# Patient Record
Sex: FEMALE | Race: WHITE | NOT HISPANIC OR LATINO | URBAN - METROPOLITAN AREA
[De-identification: names, ages, dates, MRNs, and addresses within clinical notes are randomized per-mention and may not be internally consistent; named-entity substitution may affect disease eponyms.]

---

## 2017-06-11 ENCOUNTER — EMERGENCY (EMERGENCY)
Facility: HOSPITAL | Age: 22
LOS: 1 days | Discharge: HOME | End: 2017-06-11
Attending: EMERGENCY MEDICINE | Admitting: EMERGENCY MEDICINE
Payer: COMMERCIAL

## 2017-06-11 VITALS
TEMPERATURE: 98 F | HEART RATE: 74 BPM | SYSTOLIC BLOOD PRESSURE: 101 MMHG | RESPIRATION RATE: 16 BRPM | DIASTOLIC BLOOD PRESSURE: 70 MMHG | OXYGEN SATURATION: 99 %

## 2017-06-11 VITALS
DIASTOLIC BLOOD PRESSURE: 68 MMHG | TEMPERATURE: 98 F | OXYGEN SATURATION: 99 % | SYSTOLIC BLOOD PRESSURE: 102 MMHG | RESPIRATION RATE: 16 BRPM | HEART RATE: 80 BPM

## 2017-06-11 DIAGNOSIS — Z90.49 ACQUIRED ABSENCE OF OTHER SPECIFIED PARTS OF DIGESTIVE TRACT: Chronic | ICD-10-CM

## 2017-06-11 DIAGNOSIS — Z33.1 PREGNANT STATE, INCIDENTAL: ICD-10-CM

## 2017-06-11 LAB
APPEARANCE UR: CLEAR — SIGNIFICANT CHANGE UP
BILIRUB UR-MCNC: NEGATIVE — SIGNIFICANT CHANGE UP
COLOR SPEC: SIGNIFICANT CHANGE UP
DIFF PNL FLD: NEGATIVE — SIGNIFICANT CHANGE UP
GLUCOSE UR QL: NEGATIVE — SIGNIFICANT CHANGE UP
HCG SERPL-ACNC: 451.6 MIU/ML — SIGNIFICANT CHANGE UP
KETONES UR-MCNC: NEGATIVE — SIGNIFICANT CHANGE UP
LEUKOCYTE ESTERASE UR-ACNC: NEGATIVE — SIGNIFICANT CHANGE UP
NITRITE UR-MCNC: NEGATIVE — SIGNIFICANT CHANGE UP
PH UR: 7 — SIGNIFICANT CHANGE UP (ref 5–8)
PROT UR-MCNC: SIGNIFICANT CHANGE UP
SP GR SPEC: 1.01 — SIGNIFICANT CHANGE UP (ref 1.01–1.02)
UROBILINOGEN FLD QL: NEGATIVE — SIGNIFICANT CHANGE UP

## 2017-06-11 PROCEDURE — 84702 CHORIONIC GONADOTROPIN TEST: CPT

## 2017-06-11 PROCEDURE — 76830 TRANSVAGINAL US NON-OB: CPT | Mod: 26

## 2017-06-11 PROCEDURE — 81003 URINALYSIS AUTO W/O SCOPE: CPT

## 2017-06-11 PROCEDURE — 93975 VASCULAR STUDY: CPT

## 2017-06-11 PROCEDURE — 76830 TRANSVAGINAL US NON-OB: CPT

## 2017-06-11 PROCEDURE — 99285 EMERGENCY DEPT VISIT HI MDM: CPT

## 2017-06-11 PROCEDURE — 93975 VASCULAR STUDY: CPT | Mod: 26

## 2017-06-11 PROCEDURE — 87086 URINE CULTURE/COLONY COUNT: CPT

## 2017-06-11 PROCEDURE — 99284 EMERGENCY DEPT VISIT MOD MDM: CPT | Mod: 25

## 2017-06-11 RX ORDER — SODIUM CHLORIDE 9 MG/ML
1000 INJECTION INTRAMUSCULAR; INTRAVENOUS; SUBCUTANEOUS ONCE
Qty: 0 | Refills: 0 | Status: COMPLETED | OUTPATIENT
Start: 2017-06-11 | End: 2017-06-11

## 2017-06-11 RX ADMIN — SODIUM CHLORIDE 1000 MILLILITER(S): 9 INJECTION INTRAMUSCULAR; INTRAVENOUS; SUBCUTANEOUS at 18:04

## 2017-06-11 NOTE — ED PROVIDER NOTE - MEDICAL DECISION MAKING DETAILS
21 year old woman presents with first pregnancy presents 4 weeks pregnant with concerns surrounding her pregnancy cramps.   - HCG   - TVUS   - reassurance

## 2017-06-11 NOTE — CONSULT NOTE ADULT - ASSESSMENT
21y  LMP 2017 w/ pregnancy of unknown location with intermittent lower abdominal pain, none currently, most likely early pregnancy, r/o ectopic pregnancy

## 2017-06-11 NOTE — CONSULT NOTE ADULT - SUBJECTIVE AND OBJECTIVE BOX
21y  LMP 2017 presents with positive home pregnancy test, confirmed at [facility] today. Pt reports intermittent lower abdominal cramping lasting "1 second", overnight, sent by [facility] physician for r/o ectopic pregnancy. Pt denies lower abdominal pain currently. Denies vaginal bleeding. Denies CP, SOB, fevers, chills, dysuria, change in GI sx      OB/GYN HISTORY:   G1: current       Last Menstrual Period 2017    Name of GYN Physician: Dr. Vilma Taylor  Date of Last Pap: 2016  History of Abnormal Pap:2016, w/u wnl     PAST MEDICAL & SURGICAL HISTORY:  No pertinent past medical history  History of appendectomy (at 8 yrs old)      REVIEW OF SYSTEMS    General: denies fevers, chills, tiredness    Skin/Breast: denies breast pain  	  Respiratory and Thorax: denies shortness of breath, denies cough  	  Cardiovascular: denies chest pain	 and denies palpitations    Gastrointestinal: denies abdominal pain, nausea/ vomiting	    Genitourinary: denies dysuria, increased urinary frequency, urgency	    Constitutional, Cardiovascular, Respiratory, Gastrointestinal, Genitourinary, Musculoskeletal and Integumentary review of systems are normal except as noted. 	    Meds: none    Allergies:No Known Allergies, Intolerances        SOCIAL HISTORY: denies x3    FAMILY HISTORY: non contributory      Vital Signs Last 24 Hrs  T(C): 36.9, Max: 36.9 ( @ 16:43)  T(F): 98.5, Max: 98.5 ( @ 16:43)  HR: 74 (74 - 74)  BP: 101/70 (101/70 - 101/70)  BP(mean): --  RR: 16 (16 - 16)  SpO2: 99% (99% - 99%)    PHYSICAL EXAM:      Gen: NAD, alert and oriented x 3    Cardiovascular: regular rate and rhythm, S1 and S2 present     Respiratory: CTAB    Abd: soft, non tender, non-distended, + bowel sounds.     Pelvic:  closed/ long, no CMT, Uterus: normal size, non tender    Adnexa: non tender, no palpable masses    Extremities: NTBL    Skin: warm and well perfused      LABS:    bHC.6  Urinalysis Basic - ( 2017 19:22 )    Color: PL Yellow / Appearance: Clear / S.013 / pH: x  Gluc: x / Ketone: Negative  / Bili: Negative / Urobili: Negative   Blood: x / Protein: Trace / Nitrite: Negative   Leuk Esterase: Negative / RBC: x / WBC x   Sq Epi: x / Non Sq Epi: x / Bacteria: x    TVUS (): uterus 6.3 x 3.3 x 4.1 cm. Endometrium: Measures 1.0 cm. Small amount of fluid is noted within the   endometrial canal. R ov: 2.5 x 1.7 x 2.2 cm. L ov: 4.8 x 4.3 x 4.7 cm. There is a 3.6 x 3.4 x 3.6 cm   simple cyst. There is a 1.9 x 1.7 x 1.2 cm corpus luteal cyst. Normal flow in both ovaries. Mild free fluid in the pelvis.

## 2017-06-11 NOTE — CONSULT NOTE ADULT - PROBLEM SELECTOR RECOMMENDATION 9
-rpt bHCG in 48hr at outside lab  -will call pt with results  -cont to f/u with OB Dr. Vilma Sagastume, PGY-1  D/w Dr. Shoemaker  -ectopic precautions given

## 2017-06-11 NOTE — ED PROVIDER NOTE - ATTENDING CONTRIBUTION TO CARE
I have seen and evaluated this patient with the resident.   I agree with the findings  unless other wise stated.  After my face to face bedside evaluation, I am not[age] year old female with intermittent abdominal pain x 1week found out she is pregnant today. Not having any pain here. no vaginal bleeding. PE: att exam: patient awake alert NAD . LUNGS CTAB no wheeze no crackle. CARD RRR no m/r/g.  Abdomen soft NT ND no rebound no guarding no CVA tenderness. EXT WWP no edema no calf tenderness CV 2+DP/PT bilaterally. neuro A&Ox3 gait normal.  skin warm and dry no rash

## 2017-06-11 NOTE — ED PROVIDER NOTE - OBJECTIVE STATEMENT
21 year old woman with no significant past medical history presents with concerns that she just found out that she is pregnant. This would be her first pregnancy and never has she ever experienced pelvic cramping before and is alarmed by the symptoms. LMP waa 5/5/ 2017 and today she was at urgent care where a pregnancy test resulted posityive. HCG is pending but given she was inpatient came to [facility] ER for repeat testing.   Of note the patient denies hematochezia, melena, brbpr, hematemesis, abdominal pain, nausea, vomiting, diarrhea or constipation. 21 year old female , with no significant past medical history presents with concerns that she just found out that she is pregnant. This would be her first pregnancy and never has she ever experienced pelvic cramping before and is alarmed by the symptoms. LMP waa 2017 and today she was at urgent care where a pregnancy test resulted positive. Patient denies pain at this time. No vaginal bleeding.   Of note the patient denies hematochezia, melena, brbpr, hematemesis, abdominal pain, nausea, vomiting, diarrhea or constipation.

## 2017-06-11 NOTE — ED ADULT NURSE NOTE - OBJECTIVE STATEMENT
21 year old female with co lower abd pain x 4-5 days. was seen at urgent care today where urine test showed she was pregnant.  LMP may 5th. first pregnancy no fever no chills denies CP SOB abd soft non tender  (+) pain in lower abd described as cramping intermittent no dysuria/hematuria

## 2017-06-12 LAB
CULTURE RESULTS: SIGNIFICANT CHANGE UP
SPECIMEN SOURCE: SIGNIFICANT CHANGE UP

## 2017-06-13 NOTE — ED POST DISCHARGE NOTE - DETAILS
Left VM for patient. Will recommend f/u with PCP for repeat UA/UCx. - Marietta Berry PA-C SOTERO Martinez PA Miri Dooley PA-C

## 2017-06-13 NOTE — ED POST DISCHARGE NOTE - OTHER COMMUNICATION
Spoke with patient and her .  Made aware of contaminated UCx and need to repeat UCx for continued symptoms.  Patient's  reports that they were instructed to have repeat beta HCG, but they never followed up.  Patient with continued abdominal cramping.  Patient instruced to return to the ER as soon as possible for further evaluation.  -Mert Patino PA-C

## 2017-06-13 NOTE — ED POST DISCHARGE NOTE - RESULT SUMMARY
UCx contaminated. Left VM for patient. Will recommend f/u with PCP for repeat UA/UCx. - Marietta Berry PA-C UCx contaminated.

## 2017-06-15 DIAGNOSIS — O26.899 OTHER SPECIFIED PREGNANCY RELATED CONDITIONS, UNSPECIFIED TRIMESTER: ICD-10-CM

## 2017-06-15 DIAGNOSIS — R10.2 PELVIC AND PERINEAL PAIN: ICD-10-CM

## 2017-06-18 ENCOUNTER — EMERGENCY (EMERGENCY)
Facility: HOSPITAL | Age: 22
LOS: 1 days | Discharge: HOME | End: 2017-06-18
Attending: EMERGENCY MEDICINE | Admitting: EMERGENCY MEDICINE
Payer: COMMERCIAL

## 2017-06-18 VITALS
RESPIRATION RATE: 16 BRPM | TEMPERATURE: 98 F | OXYGEN SATURATION: 99 % | DIASTOLIC BLOOD PRESSURE: 69 MMHG | SYSTOLIC BLOOD PRESSURE: 106 MMHG | HEART RATE: 86 BPM

## 2017-06-18 DIAGNOSIS — Z90.49 ACQUIRED ABSENCE OF OTHER SPECIFIED PARTS OF DIGESTIVE TRACT: Chronic | ICD-10-CM

## 2017-06-18 LAB — HCG SERPL-ACNC: 5416 MIU/ML — SIGNIFICANT CHANGE UP

## 2017-06-18 PROCEDURE — 84702 CHORIONIC GONADOTROPIN TEST: CPT

## 2017-06-18 PROCEDURE — 99283 EMERGENCY DEPT VISIT LOW MDM: CPT

## 2017-06-18 PROCEDURE — 99284 EMERGENCY DEPT VISIT MOD MDM: CPT

## 2017-06-18 NOTE — ED PROVIDER NOTE - CHPI ED SYMPTOMS POS
**ATTENDING ADDENDUM (Dr. Andrew Laws): menstrual-like cramping in context of newly identified pregnancy without established intrauterine pregnancy on US

## 2017-06-18 NOTE — ED PROVIDER NOTE - MEDICAL DECISION MAKING DETAILS
21F here for repeat beta-HCG. No pain or vaginal discharge. . Will repeat blood test. Panchito Jean-Baptiste, Resident. 21F here for repeat beta-HCG. No pain or vaginal discharge. . Will repeat blood test. Panchito Jean-Baptiste, Resident.  **ATTENDING MEDICAL DECISION MAKING/SYNTHESIS (Dr. Andrew Laws): I have reviewed the Chief Complaint, the HPI, the ROS, and have directly performed and confirmed the findings on the Physical Examination. I have reviewed the medical decision making with all providers, as applicable. The PROBLEM REPRESENTATION at this time is: 21-year-old woman with newly diagnosed pregnancy within the past week, now presenting to the ED for serial beta-HCG; NO acute complains associated with this newly-diagnosed pregnancy except for menstrual-like cramps last week. The MOST LIKELY DIAGNOSIS, and the LIST OF DIFFERENTIAL DIAGNOSES, includes (but is not limited to) the following: early intrauterine pregnancy (likely), ectopic pregnancy (diagnosis of exclusion), threatened  (possible), other OB-GYN emergency (unlikely), other medical or surgical emergency in context of pregnancy e.g. acute appendicitis, acute biliary disease (e.g. cholelithiasis, cholecystitis, or cholangitis), cyst, colitis, urinary tract infection, pyelonephritis or equivalent (unlikely given presentation and clinical findings). The likelihood of each of these diagnoses has been appropriately considered in the context of this patient's presentation and my evaluation. PLAN: as described in EMR, including diagnostics, therapeutics and consultation as clinically warranted. I will continue to reevaluate the patient, including the results of all testing, and monitor response to therapy throughout the patient's course in the ED. 21F here for repeat beta-HCG. No pain or vaginal discharge. . Will repeat blood test. Panchito Jean-Baptiste, Resident.  **ATTENDING MEDICAL DECISION MAKING/SYNTHESIS (Dr. Andrew Laws): I have reviewed the Chief Complaint, the HPI, the ROS, and have directly performed and confirmed the findings on the Physical Examination. I have reviewed the medical decision making with all providers, as applicable. The PROBLEM REPRESENTATION at this time is: 21-year-old woman with newly diagnosed pregnancy within the past week, now presenting to the ED for serial beta-HCG; NO acute complains associated with this newly-diagnosed pregnancy except for menstrual-like cramps last week (NO seizure currently, feels well). The MOST LIKELY DIAGNOSIS, and the LIST OF DIFFERENTIAL DIAGNOSES, includes (but is not limited to) the following: early intrauterine pregnancy (likely), ectopic pregnancy (diagnosis of exclusion), threatened  (possible), other OB-GYN emergency (unlikely), other medical or surgical emergency in context of pregnancy e.g. acute appendicitis, acute biliary disease (e.g. cholelithiasis, cholecystitis, or cholangitis), cyst, colitis, urinary tract infection, pyelonephritis or equivalent (unlikely given presentation and clinical findings). The likelihood of each of these diagnoses has been appropriately considered in the context of this patient's presentation and my evaluation. PLAN: as described in EMR, including diagnostics, therapeutics and consultation as clinically warranted. I will continue to reevaluate the patient, including the results of all testing, and monitor response to therapy throughout the patient's course in the ED.

## 2017-06-18 NOTE — ED PROVIDER NOTE - CARE PLAN
Goal:	ATTENDING ADDENDUM (Dr. Andrew Laws): Goals of care include resolution of emergent/urgent symptoms and concerns, and restoration to baseline level of homeostasis. Principal Discharge DX:	Less than 8 weeks gestation of pregnancy  Goal:	ATTENDING ADDENDUM (Dr. Andrew Laws): Goals of care include resolution of emergent/urgent symptoms and concerns, and restoration to baseline level of homeostasis. Principal Discharge DX:	Less than 8 weeks gestation of pregnancy  Goal:	ATTENDING ADDENDUM (Dr. Andrew Laws): Goals of care include resolution of emergent/urgent symptoms and concerns, and restoration to baseline level of homeostasis.  Instructions for follow-up, activity and diet:	ATTENDING ADDENDUM (Dr. Andrew Laws): (1) anticipatory guidance provided  (2) rest  (3) outpatient follow-up with your primary care physician/provider (4) return if symptoms worsen, persist, or do not resolve (5) medications, if indicated, as prescribed

## 2017-06-18 NOTE — ED PROVIDER NOTE - PLAN OF CARE
ATTENDING ADDENDUM (Dr. Andrew Laws): Goals of care include resolution of emergent/urgent symptoms and concerns, and restoration to baseline level of homeostasis. ATTENDING ADDENDUM (Dr. Andrew Laws): (1) anticipatory guidance provided  (2) rest  (3) outpatient follow-up with your primary care physician/provider (4) return if symptoms worsen, persist, or do not resolve (5) medications, if indicated, as prescribed

## 2017-06-18 NOTE — ED PROVIDER NOTE - PHYSICAL EXAMINATION
**ATTENDING ADDENDUM (Dr. Andrew Laws): I have reviewed and substantially contributed to the elements of the PE as documented above. I have directly performed an examination of this patient in conjunction with the other members (EM resident/PA/NP) of the patient care team.

## 2017-06-18 NOTE — ED PROVIDER NOTE - CHPI ED SYMPTOMS NEG
no vaginal discharge/no back pain/no chills/no nausea/no fever/no pain/no discharge/no dysuria/**ATTENDING ADDENDUM (Dr. Andrew Laws): NO pain or tenderness currently./no vomiting/no abdominal pain

## 2017-06-18 NOTE — ED ADULT NURSE NOTE - OBJECTIVE STATEMENT
Pt is here for repeat beta HCG.  pt had LMP may 5th 2017 . Zully vag bleed abdominal pain last weak  USG didn't show gestational sac. SO adviced for Repeat  beta & follow up care . Pt got evaluated  by ED MD Dr Laws bloods sent awaiting evaluation

## 2017-06-18 NOTE — ED PROVIDER NOTE - PROGRESS NOTE DETAILS
**ATTENDING ADDENDUM (Dr. Andrew Laws): patient serially evaluated throughout ED course. NO acute deterioration up to this time in the ED. Awaiting results of diagnostics (just sent at this time). Will continue to observe and monitor closely. Anticipatory guidance provided. **ATTENDING ADDENDUM (Dr. Andrew Laws): patient serially evaluated throughout ED course. NO acute deterioration up to this time in the ED. Of note, and in addition to the above, patient without acute complaints. Awaiting results of ED diagnostics at this time. Anticipatory guidance provided re: followup with primary care physician/provider (stated preference to followup with [facility]). Will provide details upon discharge if appropriate. NO clinical evidence of domestic violence or abuse at this time (normal-appearing interaction noted between patient and partner).

## 2017-06-18 NOTE — ED PROVIDER NOTE - ATTENDING CONTRIBUTION TO CARE
**ATTENDING ADDENDUM (Dr. Andrew Laws): I attest that I have directly examined this patient and reviewed and formulated the diagnostic and therapeutic management plan in collaboration with the EM resident. Please see MDM note and remainder of EMR for findings from CC, HPI, ROS, and PE. (Jerilyn)

## 2017-06-18 NOTE — ED PROVIDER NOTE - CONDUCTED A DETAILED DISCUSSION WITH PATIENT AND/OR GUARDIAN REGARDING, MDM
return to ED if symptoms worsen, persist or questions arise/**ATTENDING ADDENDUM (Dr. Andrew Laws): Anticipatory guidance provided./lab results/need for outpatient follow-up

## 2017-06-18 NOTE — ED ADULT NURSE NOTE - CAS EDN DISCHARGE ASSESSMENT
Denies any acute distress. DC in stable condition/Awake/Alert and oriented to person, place and time

## 2017-06-18 NOTE — ED PROVIDER NOTE - OBJECTIVE STATEMENT
21F no past medical history LMP 6/5/17 presents for repeat b-HCG. 1 wk ago patient had urine preg positive test, went to UC and told them she was having mentrual like cramps and rushed to ER for r/o ectopic. Too early to confirm IUP on US. Told to come back today for repeat beta. Denies fevers/chills, nausea/vomiting, headache, chest pain, shortness of breath, abdominal pain, bowel/bladder changes, vaginal bleeding/discharge. 21F no past medical history LMP 17 presents for repeat b-HCG. 1 wk ago patient had urine preg positive test, went to UC and told them she was having mentrual like cramps and rushed to ER for r/o ectopic. Too early to confirm IUP on US. Told to come back today for repeat beta. Denies fevers/chills, nausea/vomiting, headache, chest pain, shortness of breath, abdominal pain, bowel/bladder changes, vaginal bleeding/discharge.  **ATTENDING ADDENDUM (Dr. Andrew Laws): I attest that I have directly and personally interviewed and examined this patient and elicited a comparable history of present illness and review of systems as documented, along with my EM resident. I attest that I have made significant contributions to the documentation where necessary and as noted in the EMR. NOTE: this is the text from the ED PROVIDER NOTE by Hernesto from 2017: "21 year old female , with no significant past medical history presents with concerns that she just found out that she is pregnant. This would be her first pregnancy and never has she ever experienced pelvic cramping before and is alarmed by the symptoms. LMP waa 2017 and today she was at urgent care where a pregnancy test resulted positive. Patient denies pain at this time. No vaginal bleeding." During interval period, patient reports NO fevers, chills, syncope, near-syncope, chest pain, palpitations, shortness of breath, dyspnea on exertion, abdominal pain, frequency, urgency, hesitancy, dysuria, or flank/back pain. NO new medications. NO vaginal bleeding or discharge reported. Here for evaluation. VAS 0-1/10. 21F no past medical history LMP 17 presents for repeat b-HCG. 1 wk ago patient had urine preg positive test, went to UC and told them she was having mentrual like cramps and rushed to ER for r/o ectopic. Too early to confirm IUP on US. Told to come back today for repeat beta. Denies fevers/chills, nausea/vomiting, headache, chest pain, shortness of breath, abdominal pain, bowel/bladder changes, vaginal bleeding/discharge.  **ATTENDING ADDENDUM (Dr. Andrew Laws): I attest that I have directly and personally interviewed and examined this patient and elicited a comparable history of present illness and review of systems as documented, along with my EM resident. I attest that I have made significant contributions to the documentation where necessary and as noted in the EMR. NOTE: this is the text from the ED PROVIDER NOTE by Hernesto from 2017: "21 year old female , with no significant past medical history presents with concerns that she just found out that she is pregnant. This would be her first pregnancy and never has she ever experienced pelvic cramping before and is alarmed by the symptoms. LMP waa 2017 and today she was at urgent care where a pregnancy test resulted positive. Patient denies pain at this time. No vaginal bleeding." During interval period, patient reports NO fevers, chills, syncope, near-syncope, chest pain, palpitations, shortness of breath, dyspnea on exertion, abdominal pain, frequency, urgency, hesitancy, dysuria, or flank/back pain. NO new medications. NO vaginal bleeding or discharge reported. DENIES other pregnancies or medical problems. Has been adherent with prior recommendations to take prenatal vitamins as previously prescribed. Here for evaluation. VAS 0-1/10.

## 2017-06-21 NOTE — CHART NOTE - NSCHARTNOTEFT_GEN_A_CORE
Called patient to review results of rising bHCG and to arrange for follow-up clinic appointment and TVUS.  Patient did not answer phone.  Message left with clinic contact number.    JOSH Burks PGY2

## 2017-06-23 ENCOUNTER — APPOINTMENT (OUTPATIENT)
Dept: OBGYN | Facility: CLINIC | Age: 22
End: 2017-06-23

## 2017-06-23 VITALS
DIASTOLIC BLOOD PRESSURE: 69 MMHG | WEIGHT: 190.38 LBS | HEIGHT: 69 IN | BODY MASS INDEX: 28.2 KG/M2 | SYSTOLIC BLOOD PRESSURE: 106 MMHG

## 2017-06-23 DIAGNOSIS — Z01.419 ENCOUNTER FOR GYNECOLOGICAL EXAMINATION (GENERAL) (ROUTINE) W/OUT ABNORMAL FINDINGS: ICD-10-CM

## 2017-06-23 DIAGNOSIS — Z56.0 UNEMPLOYMENT, UNSPECIFIED: ICD-10-CM

## 2017-06-23 DIAGNOSIS — Z78.9 OTHER SPECIFIED HEALTH STATUS: ICD-10-CM

## 2017-06-23 PROBLEM — Z00.00 ENCOUNTER FOR PREVENTIVE HEALTH EXAMINATION: Status: ACTIVE | Noted: 2017-06-23

## 2017-06-23 RX ORDER — PRENATAL VIT NO.130/IRON/FOLIC 27MG-0.8MG
TABLET ORAL
Refills: 0 | Status: ACTIVE | COMMUNITY

## 2017-06-23 SDOH — ECONOMIC STABILITY - INCOME SECURITY: UNEMPLOYMENT, UNSPECIFIED: Z56.0

## 2017-06-25 LAB
C TRACH RRNA SPEC QL NAA+PROBE: NORMAL
N GONORRHOEA RRNA SPEC QL NAA+PROBE: NORMAL
SOURCE AMPLIFICATION: NORMAL

## 2017-07-03 LAB — CYTOLOGY CVX/VAG DOC THIN PREP: NORMAL

## 2017-07-06 ENCOUNTER — APPOINTMENT (OUTPATIENT)
Dept: OBGYN | Facility: CLINIC | Age: 22
End: 2017-07-06

## 2017-07-06 VITALS
SYSTOLIC BLOOD PRESSURE: 98 MMHG | BODY MASS INDEX: 28.58 KG/M2 | DIASTOLIC BLOOD PRESSURE: 60 MMHG | HEIGHT: 69 IN | WEIGHT: 193 LBS

## 2017-07-20 PROBLEM — N94.89 SUPPRESSION OF MENSES: Status: RESOLVED | Noted: 2017-06-23 | Resolved: 2017-07-20

## 2017-07-21 ENCOUNTER — APPOINTMENT (OUTPATIENT)
Dept: OBGYN | Facility: CLINIC | Age: 22
End: 2017-07-21

## 2017-07-21 VITALS
DIASTOLIC BLOOD PRESSURE: 60 MMHG | SYSTOLIC BLOOD PRESSURE: 100 MMHG | HEIGHT: 69 IN | WEIGHT: 194 LBS | BODY MASS INDEX: 28.73 KG/M2

## 2017-07-21 DIAGNOSIS — N94.89 OTHER SPECIFIED CONDITIONS ASSOCIATED WITH FEMALE GENITAL ORGANS AND MENSTRUAL CYCLE: ICD-10-CM

## 2017-07-27 ENCOUNTER — APPOINTMENT (OUTPATIENT)
Dept: ANTEPARTUM | Facility: CLINIC | Age: 22
End: 2017-07-27
Payer: COMMERCIAL

## 2017-07-27 ENCOUNTER — ASOB RESULT (OUTPATIENT)
Age: 22
End: 2017-07-27

## 2017-07-27 LAB
ABO + RH PNL BLD: NORMAL
ADJUSTED MITOGEN: 8.76 IU/ML
ADJUSTED TB AG: -0.94 IU/ML
B19V IGG SER QL IA: 4.5 INDEX
B19V IGG+IGM SER-IMP: NORMAL
B19V IGG+IGM SER-IMP: POSITIVE
B19V IGM FLD-ACNC: 0.2 INDEX
B19V IGM SER-ACNC: NEGATIVE
BASOPHILS # BLD AUTO: 0.01 K/UL
BASOPHILS NFR BLD AUTO: 0.1 %
BLD GP AB SCN SERPL QL: NORMAL
CMV IGG SERPL QL: 5.7 U/ML
CMV IGG SERPL-IMP: POSITIVE
CMV IGM SERPL QL: <8 AU/ML
CMV IGM SERPL QL: NEGATIVE
EOSINOPHIL # BLD AUTO: 0.06 K/UL
EOSINOPHIL NFR BLD AUTO: 0.5 %
HBV SURFACE AG SER QL: NONREACTIVE
HCT VFR BLD CALC: 34.5 %
HCV AB SER QL: NONREACTIVE
HCV S/CO RATIO: 0.09 S/CO
HGB A MFR BLD: 96.5 %
HGB A2 MFR BLD: 2.8 %
HGB BLD-MCNC: 11.3 G/DL
HGB F MFR BLD: 0.7 %
HGB FRACT BLD-IMP: NORMAL
HIV1+2 AB SPEC QL IA.RAPID: NONREACTIVE
IMM GRANULOCYTES NFR BLD AUTO: 0.2 %
LEAD BLD-MCNC: 2 UG/DL
LYMPHOCYTES # BLD AUTO: 3.63 K/UL
LYMPHOCYTES NFR BLD AUTO: 27.4 %
M TB IFN-G BLD-IMP: NEGATIVE
MAN DIFF?: NORMAL
MCHC RBC-ENTMCNC: 30 PG
MCHC RBC-ENTMCNC: 32.8 GM/DL
MCV RBC AUTO: 91.5 FL
MONOCYTES # BLD AUTO: 0.72 K/UL
MONOCYTES NFR BLD AUTO: 5.4 %
NEUTROPHILS # BLD AUTO: 8.8 K/UL
NEUTROPHILS NFR BLD AUTO: 66.4 %
PLATELET # BLD AUTO: 276 K/UL
QUANTIFERON GOLD NIL: 2.14 IU/ML
RBC # BLD: 3.77 M/UL
RBC # FLD: 12.6 %
RPR SER QL: NORMAL
RUBV IGG FLD-ACNC: 3.2 INDEX
RUBV IGG SER-IMP: POSITIVE
TSH SERPL-ACNC: 0.42 UIU/ML
VZV AB TITR SER: POSITIVE
VZV IGG SER IF-ACNC: 1197 INDEX
WBC # FLD AUTO: 13.25 K/UL

## 2017-07-27 PROCEDURE — 76801 OB US < 14 WKS SINGLE FETUS: CPT

## 2017-07-28 LAB — FMR1 GENE MUT ANL BLD/T: NORMAL

## 2017-08-03 ENCOUNTER — ASOB RESULT (OUTPATIENT)
Age: 22
End: 2017-08-03

## 2017-08-03 ENCOUNTER — APPOINTMENT (OUTPATIENT)
Dept: ANTEPARTUM | Facility: CLINIC | Age: 22
End: 2017-08-03
Payer: COMMERCIAL

## 2017-08-03 LAB
AR GENE MUT ANL BLD/T: NORMAL
CFTR MUT TESTED BLD/T: NORMAL

## 2017-08-03 PROCEDURE — 76813 OB US NUCHAL MEAS 1 GEST: CPT

## 2017-08-03 RX ORDER — ASCORBIC ACID, CHOLECALCIFEROL, .ALPHA.-TOCOPHEROL ACETATE, DL-, PYRIDOXINE, FOLIC ACID, CALCIUM, FERROUS FUMARATE, DOCONEXENT 28; 160; 27; 400; 30; 25; 1.25; 3 MG/1; MG/1; MG/1; [IU]/1; [IU]/1; MG/1; MG/1; MG/1
27-1.25-3 CAPSULE, GELATIN COATED ORAL
Qty: 90 | Refills: 3 | Status: ACTIVE | COMMUNITY
Start: 2017-07-06 | End: 1900-01-01

## 2017-08-15 PROBLEM — Z34.01 ENCOUNTER FOR PRENATAL CARE IN FIRST TRIMESTER OF FIRST PREGNANCY: Status: RESOLVED | Noted: 2017-07-20 | Resolved: 2017-08-15

## 2017-08-15 PROBLEM — O09.611 YOUNG PRIMIGRAVIDA IN FIRST TRIMESTER: Status: RESOLVED | Noted: 2017-07-20 | Resolved: 2017-08-15

## 2017-08-17 ENCOUNTER — APPOINTMENT (OUTPATIENT)
Dept: OBGYN | Facility: CLINIC | Age: 22
End: 2017-08-17
Payer: COMMERCIAL

## 2017-08-17 VITALS
BODY MASS INDEX: 28.58 KG/M2 | SYSTOLIC BLOOD PRESSURE: 100 MMHG | DIASTOLIC BLOOD PRESSURE: 60 MMHG | HEIGHT: 69 IN | WEIGHT: 193 LBS

## 2017-08-17 DIAGNOSIS — O09.611: ICD-10-CM

## 2017-08-17 DIAGNOSIS — Z34.01 ENCOUNTER FOR SUPERVISION OF NORMAL FIRST PREGNANCY, FIRST TRIMESTER: ICD-10-CM

## 2017-08-17 PROCEDURE — 0502F SUBSEQUENT PRENATAL CARE: CPT

## 2017-08-31 PROBLEM — O09.611: Status: RESOLVED | Noted: 2017-07-20 | Resolved: 2017-08-31

## 2017-09-01 ENCOUNTER — APPOINTMENT (OUTPATIENT)
Dept: OBGYN | Facility: CLINIC | Age: 22
End: 2017-09-01
Payer: COMMERCIAL

## 2017-09-01 VITALS — WEIGHT: 199 LBS | SYSTOLIC BLOOD PRESSURE: 100 MMHG | DIASTOLIC BLOOD PRESSURE: 62 MMHG

## 2017-09-01 DIAGNOSIS — O09.611: ICD-10-CM

## 2017-09-01 PROCEDURE — 0502F SUBSEQUENT PRENATAL CARE: CPT

## 2017-09-02 ENCOUNTER — TRANSCRIPTION ENCOUNTER (OUTPATIENT)
Age: 22
End: 2017-09-02

## 2017-09-07 LAB
1ST TRIMESTER DATA: NORMAL
2ND TRIMESTER DATA: NORMAL
AFP PNL SERPL: NORMAL
AFP SERPL-ACNC: NORMAL
AFP SERPL-ACNC: NORMAL
B-HCG FREE SERPL-MCNC: NORMAL
CLINICAL BIOCHEMIST REVIEW: NORMAL
FREE BETA HCG 1ST TRIMESTER: NORMAL
INHIBIN A SERPL-MCNC: NORMAL
NOTES NTD: NORMAL
NT: NORMAL
PAPP-A SERPL-ACNC: NORMAL
U ESTRIOL SERPL-SCNC: NORMAL

## 2017-09-11 ENCOUNTER — APPOINTMENT (OUTPATIENT)
Dept: OBGYN | Facility: CLINIC | Age: 22
End: 2017-09-11
Payer: COMMERCIAL

## 2017-09-11 ENCOUNTER — OTHER (OUTPATIENT)
Age: 22
End: 2017-09-11

## 2017-09-11 PROCEDURE — 0502F SUBSEQUENT PRENATAL CARE: CPT

## 2017-09-25 ENCOUNTER — ASOB RESULT (OUTPATIENT)
Age: 22
End: 2017-09-25

## 2017-09-25 ENCOUNTER — APPOINTMENT (OUTPATIENT)
Dept: ANTEPARTUM | Facility: CLINIC | Age: 22
End: 2017-09-25
Payer: COMMERCIAL

## 2017-09-25 PROCEDURE — 76817 TRANSVAGINAL US OBSTETRIC: CPT

## 2017-09-25 PROCEDURE — 76811 OB US DETAILED SNGL FETUS: CPT

## 2017-09-26 ENCOUNTER — APPOINTMENT (OUTPATIENT)
Dept: OBGYN | Facility: CLINIC | Age: 22
End: 2017-09-26
Payer: COMMERCIAL

## 2017-09-26 VITALS — SYSTOLIC BLOOD PRESSURE: 100 MMHG | DIASTOLIC BLOOD PRESSURE: 60 MMHG | WEIGHT: 200 LBS

## 2017-09-26 PROCEDURE — 0502F SUBSEQUENT PRENATAL CARE: CPT

## 2017-09-26 PROCEDURE — 90686 IIV4 VACC NO PRSV 0.5 ML IM: CPT

## 2017-09-26 PROCEDURE — 90471 IMMUNIZATION ADMIN: CPT

## 2017-10-02 ENCOUNTER — APPOINTMENT (OUTPATIENT)
Dept: ANTEPARTUM | Facility: CLINIC | Age: 22
End: 2017-10-02
Payer: COMMERCIAL

## 2017-10-02 ENCOUNTER — ASOB RESULT (OUTPATIENT)
Age: 22
End: 2017-10-02

## 2017-10-02 PROCEDURE — 76816 OB US FOLLOW-UP PER FETUS: CPT

## 2017-10-04 ENCOUNTER — FORM ENCOUNTER (OUTPATIENT)
Age: 22
End: 2017-10-04

## 2017-10-05 ENCOUNTER — OUTPATIENT (OUTPATIENT)
Dept: OUTPATIENT SERVICES | Age: 22
LOS: 1 days | End: 2017-10-05
Payer: COMMERCIAL

## 2017-10-05 ENCOUNTER — APPOINTMENT (OUTPATIENT)
Dept: MRI IMAGING | Facility: HOSPITAL | Age: 22
End: 2017-10-05

## 2017-10-05 DIAGNOSIS — Z90.49 ACQUIRED ABSENCE OF OTHER SPECIFIED PARTS OF DIGESTIVE TRACT: Chronic | ICD-10-CM

## 2017-10-05 DIAGNOSIS — Q89.7 MULTIPLE CONGENITAL MALFORMATIONS, NOT ELSEWHERE CLASSIFIED: ICD-10-CM

## 2017-10-05 PROCEDURE — 74712 MRI FETAL SNGL/1ST GESTATION: CPT | Mod: 26

## 2017-10-23 ENCOUNTER — APPOINTMENT (OUTPATIENT)
Dept: OBGYN | Facility: CLINIC | Age: 22
End: 2017-10-23
Payer: COMMERCIAL

## 2017-10-23 VITALS
WEIGHT: 209 LBS | DIASTOLIC BLOOD PRESSURE: 68 MMHG | SYSTOLIC BLOOD PRESSURE: 106 MMHG | HEIGHT: 69 IN | BODY MASS INDEX: 30.96 KG/M2

## 2017-10-23 DIAGNOSIS — Z86.19 PERSONAL HISTORY OF OTHER INFECTIOUS AND PARASITIC DISEASES: ICD-10-CM

## 2017-10-23 PROCEDURE — 0502F SUBSEQUENT PRENATAL CARE: CPT

## 2017-11-17 ENCOUNTER — APPOINTMENT (OUTPATIENT)
Dept: OBGYN | Facility: CLINIC | Age: 22
End: 2017-11-17
Payer: COMMERCIAL

## 2017-11-17 VITALS
BODY MASS INDEX: 32.44 KG/M2 | WEIGHT: 219 LBS | SYSTOLIC BLOOD PRESSURE: 100 MMHG | HEIGHT: 69 IN | DIASTOLIC BLOOD PRESSURE: 60 MMHG

## 2017-11-17 DIAGNOSIS — O09.612 SUPERVISION OF YOUNG PRIMIGRAVIDA, SECOND TRIMESTER: ICD-10-CM

## 2017-11-17 DIAGNOSIS — Z34.02 ENCOUNTER FOR SUPERVISION OF NORMAL FIRST PREGNANCY, SECOND TRIMESTER: ICD-10-CM

## 2017-11-17 PROCEDURE — 0502F SUBSEQUENT PRENATAL CARE: CPT

## 2017-11-20 LAB
BASOPHILS # BLD AUTO: 0.01 K/UL
BASOPHILS NFR BLD AUTO: 0.1 %
EOSINOPHIL # BLD AUTO: 0.07 K/UL
EOSINOPHIL NFR BLD AUTO: 0.5 %
GLUCOSE 1H P 50 G GLC PO SERPL-MCNC: 132 MG/DL
HCT VFR BLD CALC: 33 %
HGB BLD-MCNC: 10.7 G/DL
HIV1+2 AB SPEC QL IA.RAPID: NONREACTIVE
IMM GRANULOCYTES NFR BLD AUTO: 0.4 %
LYMPHOCYTES # BLD AUTO: 3.12 K/UL
LYMPHOCYTES NFR BLD AUTO: 23.2 %
MAN DIFF?: NORMAL
MCHC RBC-ENTMCNC: 30.4 PG
MCHC RBC-ENTMCNC: 32.4 GM/DL
MCV RBC AUTO: 93.8 FL
MONOCYTES # BLD AUTO: 0.69 K/UL
MONOCYTES NFR BLD AUTO: 5.1 %
NEUTROPHILS # BLD AUTO: 9.47 K/UL
NEUTROPHILS NFR BLD AUTO: 70.7 %
PLATELET # BLD AUTO: 233 K/UL
RBC # BLD: 3.52 M/UL
RBC # FLD: 12.9 %
WBC # FLD AUTO: 13.42 K/UL

## 2017-12-08 ENCOUNTER — APPOINTMENT (OUTPATIENT)
Dept: OBGYN | Facility: CLINIC | Age: 22
End: 2017-12-08
Payer: COMMERCIAL

## 2017-12-08 VITALS
WEIGHT: 224 LBS | SYSTOLIC BLOOD PRESSURE: 110 MMHG | BODY MASS INDEX: 33.18 KG/M2 | HEIGHT: 69 IN | DIASTOLIC BLOOD PRESSURE: 60 MMHG

## 2017-12-08 PROCEDURE — 90715 TDAP VACCINE 7 YRS/> IM: CPT

## 2017-12-08 PROCEDURE — 0502F SUBSEQUENT PRENATAL CARE: CPT

## 2017-12-08 PROCEDURE — 90471 IMMUNIZATION ADMIN: CPT

## 2017-12-22 ENCOUNTER — APPOINTMENT (OUTPATIENT)
Dept: OBGYN | Facility: CLINIC | Age: 22
End: 2017-12-22
Payer: COMMERCIAL

## 2017-12-22 VITALS
SYSTOLIC BLOOD PRESSURE: 122 MMHG | DIASTOLIC BLOOD PRESSURE: 73 MMHG | BODY MASS INDEX: 34.07 KG/M2 | HEIGHT: 69 IN | WEIGHT: 230 LBS

## 2017-12-22 PROCEDURE — 0502F SUBSEQUENT PRENATAL CARE: CPT

## 2018-01-12 ENCOUNTER — APPOINTMENT (OUTPATIENT)
Dept: OBGYN | Facility: CLINIC | Age: 23
End: 2018-01-12
Payer: COMMERCIAL

## 2018-01-12 VITALS
BODY MASS INDEX: 35.4 KG/M2 | HEIGHT: 69 IN | SYSTOLIC BLOOD PRESSURE: 122 MMHG | WEIGHT: 239 LBS | DIASTOLIC BLOOD PRESSURE: 70 MMHG

## 2018-01-12 PROCEDURE — 0502F SUBSEQUENT PRENATAL CARE: CPT

## 2018-01-19 ENCOUNTER — APPOINTMENT (OUTPATIENT)
Dept: OBGYN | Facility: CLINIC | Age: 23
End: 2018-01-19
Payer: COMMERCIAL

## 2018-01-19 VITALS
DIASTOLIC BLOOD PRESSURE: 70 MMHG | BODY MASS INDEX: 35.4 KG/M2 | WEIGHT: 239 LBS | HEIGHT: 69 IN | SYSTOLIC BLOOD PRESSURE: 120 MMHG

## 2018-01-19 PROCEDURE — 0502F SUBSEQUENT PRENATAL CARE: CPT

## 2018-01-22 LAB
GP B STREP DNA SPEC QL NAA+PROBE: DETECTED
GP B STREP DNA SPEC QL NAA+PROBE: NORMAL
SOURCE GBS: NORMAL

## 2018-01-26 ENCOUNTER — APPOINTMENT (OUTPATIENT)
Dept: ANTEPARTUM | Facility: CLINIC | Age: 23
End: 2018-01-26

## 2018-01-26 ENCOUNTER — APPOINTMENT (OUTPATIENT)
Dept: ANTEPARTUM | Facility: CLINIC | Age: 23
End: 2018-01-26
Payer: COMMERCIAL

## 2018-01-26 ENCOUNTER — APPOINTMENT (OUTPATIENT)
Dept: OBGYN | Facility: CLINIC | Age: 23
End: 2018-01-26
Payer: COMMERCIAL

## 2018-01-26 ENCOUNTER — ASOB RESULT (OUTPATIENT)
Age: 23
End: 2018-01-26

## 2018-01-26 VITALS
DIASTOLIC BLOOD PRESSURE: 74 MMHG | HEIGHT: 69 IN | WEIGHT: 245 LBS | SYSTOLIC BLOOD PRESSURE: 120 MMHG | BODY MASS INDEX: 36.29 KG/M2

## 2018-01-26 PROCEDURE — 76818 FETAL BIOPHYS PROFILE W/NST: CPT

## 2018-01-26 PROCEDURE — 0502F SUBSEQUENT PRENATAL CARE: CPT

## 2018-02-02 ENCOUNTER — APPOINTMENT (OUTPATIENT)
Dept: OBGYN | Facility: CLINIC | Age: 23
End: 2018-02-02
Payer: COMMERCIAL

## 2018-02-02 VITALS
HEIGHT: 69 IN | SYSTOLIC BLOOD PRESSURE: 130 MMHG | WEIGHT: 244 LBS | BODY MASS INDEX: 36.14 KG/M2 | DIASTOLIC BLOOD PRESSURE: 78 MMHG

## 2018-02-02 DIAGNOSIS — O36.8190 DECREASED FETAL MOVEMENTS, UNSPECIFIED TRIMESTER, NOT APPLICABLE OR UNSPECIFIED: ICD-10-CM

## 2018-02-02 PROCEDURE — 0502F SUBSEQUENT PRENATAL CARE: CPT

## 2018-02-04 ENCOUNTER — INPATIENT (INPATIENT)
Facility: HOSPITAL | Age: 23
LOS: 2 days | Discharge: HOME | End: 2018-02-07
Attending: OBSTETRICS & GYNECOLOGY | Admitting: OBSTETRICS & GYNECOLOGY
Payer: COMMERCIAL

## 2018-02-04 VITALS — WEIGHT: 242.51 LBS | HEIGHT: 68 IN

## 2018-02-04 DIAGNOSIS — Z3A.00 WEEKS OF GESTATION OF PREGNANCY NOT SPECIFIED: ICD-10-CM

## 2018-02-04 DIAGNOSIS — Z34.80 ENCOUNTER FOR SUPERVISION OF OTHER NORMAL PREGNANCY, UNSPECIFIED TRIMESTER: ICD-10-CM

## 2018-02-04 DIAGNOSIS — O26.899 OTHER SPECIFIED PREGNANCY RELATED CONDITIONS, UNSPECIFIED TRIMESTER: ICD-10-CM

## 2018-02-04 DIAGNOSIS — Z90.49 ACQUIRED ABSENCE OF OTHER SPECIFIED PARTS OF DIGESTIVE TRACT: Chronic | ICD-10-CM

## 2018-02-04 LAB
BASOPHILS # BLD AUTO: 0.1 K/UL — SIGNIFICANT CHANGE UP (ref 0–0.2)
BASOPHILS NFR BLD AUTO: 0.5 % — SIGNIFICANT CHANGE UP (ref 0–2)
BLD GP AB SCN SERPL QL: NEGATIVE — SIGNIFICANT CHANGE UP
EOSINOPHIL # BLD AUTO: 0.1 K/UL — SIGNIFICANT CHANGE UP (ref 0–0.5)
EOSINOPHIL NFR BLD AUTO: 0.4 % — SIGNIFICANT CHANGE UP (ref 0–6)
HCT VFR BLD CALC: 36.1 % — SIGNIFICANT CHANGE UP (ref 34.5–45)
HGB BLD-MCNC: 12.6 G/DL — SIGNIFICANT CHANGE UP (ref 11.5–15.5)
LYMPHOCYTES # BLD AUTO: 19.8 % — SIGNIFICANT CHANGE UP (ref 13–44)
LYMPHOCYTES # BLD AUTO: 2.9 K/UL — SIGNIFICANT CHANGE UP (ref 1–3.3)
MCHC RBC-ENTMCNC: 31.8 PG — SIGNIFICANT CHANGE UP (ref 27–34)
MCHC RBC-ENTMCNC: 34.9 GM/DL — SIGNIFICANT CHANGE UP (ref 32–36)
MCV RBC AUTO: 91.2 FL — SIGNIFICANT CHANGE UP (ref 80–100)
MONOCYTES # BLD AUTO: 0.8 K/UL — SIGNIFICANT CHANGE UP (ref 0–0.9)
MONOCYTES NFR BLD AUTO: 5.2 % — SIGNIFICANT CHANGE UP (ref 2–14)
NEUTROPHILS # BLD AUTO: 11 K/UL — HIGH (ref 1.8–7.4)
NEUTROPHILS NFR BLD AUTO: 74.2 % — SIGNIFICANT CHANGE UP (ref 43–77)
PLATELET # BLD AUTO: 220 K/UL — SIGNIFICANT CHANGE UP (ref 150–400)
RBC # BLD: 3.96 M/UL — SIGNIFICANT CHANGE UP (ref 3.8–5.2)
RBC # FLD: 12.8 % — SIGNIFICANT CHANGE UP (ref 10.3–14.5)
RH IG SCN BLD-IMP: POSITIVE — SIGNIFICANT CHANGE UP
RH IG SCN BLD-IMP: POSITIVE — SIGNIFICANT CHANGE UP
WBC # BLD: 14.9 K/UL — HIGH (ref 3.8–10.5)
WBC # FLD AUTO: 14.9 K/UL — HIGH (ref 3.8–10.5)

## 2018-02-04 RX ORDER — AMPICILLIN TRIHYDRATE 250 MG
2 CAPSULE ORAL ONCE
Qty: 0 | Refills: 0 | Status: COMPLETED | OUTPATIENT
Start: 2018-02-04 | End: 2018-02-04

## 2018-02-04 RX ORDER — CITRIC ACID/SODIUM CITRATE 300-500 MG
15 SOLUTION, ORAL ORAL EVERY 4 HOURS
Qty: 0 | Refills: 0 | Status: DISCONTINUED | OUTPATIENT
Start: 2018-02-04 | End: 2018-02-05

## 2018-02-04 RX ORDER — SODIUM CHLORIDE 9 MG/ML
1000 INJECTION, SOLUTION INTRAVENOUS ONCE
Qty: 0 | Refills: 0 | Status: COMPLETED | OUTPATIENT
Start: 2018-02-04 | End: 2018-02-04

## 2018-02-04 RX ORDER — AMPICILLIN TRIHYDRATE 250 MG
1 CAPSULE ORAL EVERY 4 HOURS
Qty: 0 | Refills: 0 | Status: DISCONTINUED | OUTPATIENT
Start: 2018-02-04 | End: 2018-02-05

## 2018-02-04 RX ORDER — AMPICILLIN TRIHYDRATE 250 MG
CAPSULE ORAL
Qty: 0 | Refills: 0 | Status: DISCONTINUED | OUTPATIENT
Start: 2018-02-04 | End: 2018-02-05

## 2018-02-04 RX ORDER — OXYTOCIN 10 UNIT/ML
333.33 VIAL (ML) INJECTION
Qty: 20 | Refills: 0 | Status: DISCONTINUED | OUTPATIENT
Start: 2018-02-04 | End: 2018-02-05

## 2018-02-04 RX ORDER — SODIUM CHLORIDE 9 MG/ML
1000 INJECTION, SOLUTION INTRAVENOUS
Qty: 0 | Refills: 0 | Status: DISCONTINUED | OUTPATIENT
Start: 2018-02-04 | End: 2018-02-05

## 2018-02-04 RX ADMIN — Medication 216 GRAM(S): at 15:12

## 2018-02-04 RX ADMIN — SODIUM CHLORIDE 125 MILLILITER(S): 9 INJECTION, SOLUTION INTRAVENOUS at 15:13

## 2018-02-04 RX ADMIN — Medication 208 GRAM(S): at 22:53

## 2018-02-04 RX ADMIN — Medication 15 MILLILITER(S): at 15:46

## 2018-02-04 RX ADMIN — SODIUM CHLORIDE 2000 MILLILITER(S): 9 INJECTION, SOLUTION INTRAVENOUS at 15:12

## 2018-02-04 RX ADMIN — Medication 208 GRAM(S): at 18:51

## 2018-02-05 LAB — T PALLIDUM AB TITR SER: NEGATIVE — SIGNIFICANT CHANGE UP

## 2018-02-05 PROCEDURE — 59409 OBSTETRICAL CARE: CPT

## 2018-02-05 RX ORDER — KETOROLAC TROMETHAMINE 30 MG/ML
30 SYRINGE (ML) INJECTION ONCE
Qty: 0 | Refills: 0 | Status: DISCONTINUED | OUTPATIENT
Start: 2018-02-05 | End: 2018-02-05

## 2018-02-05 RX ORDER — ACETAMINOPHEN 500 MG
975 TABLET ORAL EVERY 6 HOURS
Qty: 0 | Refills: 0 | Status: COMPLETED | OUTPATIENT
Start: 2018-02-05 | End: 2019-01-04

## 2018-02-05 RX ORDER — SODIUM CHLORIDE 9 MG/ML
3 INJECTION INTRAMUSCULAR; INTRAVENOUS; SUBCUTANEOUS EVERY 8 HOURS
Qty: 0 | Refills: 0 | Status: DISCONTINUED | OUTPATIENT
Start: 2018-02-05 | End: 2018-02-07

## 2018-02-05 RX ORDER — GLYCERIN ADULT
1 SUPPOSITORY, RECTAL RECTAL AT BEDTIME
Qty: 0 | Refills: 0 | Status: DISCONTINUED | OUTPATIENT
Start: 2018-02-05 | End: 2018-02-07

## 2018-02-05 RX ORDER — OXYTOCIN 10 UNIT/ML
41.67 VIAL (ML) INJECTION
Qty: 20 | Refills: 0 | Status: DISCONTINUED | OUTPATIENT
Start: 2018-02-05 | End: 2018-02-05

## 2018-02-05 RX ORDER — IBUPROFEN 200 MG
600 TABLET ORAL EVERY 6 HOURS
Qty: 0 | Refills: 0 | Status: DISCONTINUED | OUTPATIENT
Start: 2018-02-05 | End: 2018-02-07

## 2018-02-05 RX ORDER — AER TRAVELER 0.5 G/1
1 SOLUTION RECTAL; TOPICAL EVERY 4 HOURS
Qty: 0 | Refills: 0 | Status: DISCONTINUED | OUTPATIENT
Start: 2018-02-05 | End: 2018-02-07

## 2018-02-05 RX ORDER — PRAMOXINE HYDROCHLORIDE 150 MG/15G
1 AEROSOL, FOAM RECTAL EVERY 4 HOURS
Qty: 0 | Refills: 0 | Status: DISCONTINUED | OUTPATIENT
Start: 2018-02-05 | End: 2018-02-07

## 2018-02-05 RX ORDER — OXYCODONE HYDROCHLORIDE 5 MG/1
5 TABLET ORAL
Qty: 0 | Refills: 0 | Status: DISCONTINUED | OUTPATIENT
Start: 2018-02-05 | End: 2018-02-07

## 2018-02-05 RX ORDER — HYDROCORTISONE 1 %
1 OINTMENT (GRAM) TOPICAL EVERY 4 HOURS
Qty: 0 | Refills: 0 | Status: DISCONTINUED | OUTPATIENT
Start: 2018-02-05 | End: 2018-02-05

## 2018-02-05 RX ORDER — DIBUCAINE 1 %
1 OINTMENT (GRAM) RECTAL EVERY 4 HOURS
Qty: 0 | Refills: 0 | Status: DISCONTINUED | OUTPATIENT
Start: 2018-02-05 | End: 2018-02-05

## 2018-02-05 RX ORDER — DOCUSATE SODIUM 100 MG
100 CAPSULE ORAL
Qty: 0 | Refills: 0 | Status: DISCONTINUED | OUTPATIENT
Start: 2018-02-05 | End: 2018-02-06

## 2018-02-05 RX ORDER — MAGNESIUM HYDROXIDE 400 MG/1
30 TABLET, CHEWABLE ORAL
Qty: 0 | Refills: 0 | Status: DISCONTINUED | OUTPATIENT
Start: 2018-02-05 | End: 2018-02-07

## 2018-02-05 RX ORDER — SODIUM CHLORIDE 9 MG/ML
3 INJECTION INTRAMUSCULAR; INTRAVENOUS; SUBCUTANEOUS EVERY 8 HOURS
Qty: 0 | Refills: 0 | Status: DISCONTINUED | OUTPATIENT
Start: 2018-02-05 | End: 2018-02-05

## 2018-02-05 RX ORDER — IBUPROFEN 200 MG
600 TABLET ORAL EVERY 6 HOURS
Qty: 0 | Refills: 0 | Status: COMPLETED | OUTPATIENT
Start: 2018-02-05 | End: 2019-01-04

## 2018-02-05 RX ORDER — PRAMOXINE HYDROCHLORIDE 150 MG/15G
1 AEROSOL, FOAM RECTAL EVERY 4 HOURS
Qty: 0 | Refills: 0 | Status: DISCONTINUED | OUTPATIENT
Start: 2018-02-05 | End: 2018-02-05

## 2018-02-05 RX ORDER — DIPHENHYDRAMINE HCL 50 MG
25 CAPSULE ORAL EVERY 6 HOURS
Qty: 0 | Refills: 0 | Status: DISCONTINUED | OUTPATIENT
Start: 2018-02-05 | End: 2018-02-07

## 2018-02-05 RX ORDER — SIMETHICONE 80 MG/1
80 TABLET, CHEWABLE ORAL EVERY 6 HOURS
Qty: 0 | Refills: 0 | Status: DISCONTINUED | OUTPATIENT
Start: 2018-02-05 | End: 2018-02-07

## 2018-02-05 RX ORDER — AER TRAVELER 0.5 G/1
1 SOLUTION RECTAL; TOPICAL EVERY 4 HOURS
Qty: 0 | Refills: 0 | Status: DISCONTINUED | OUTPATIENT
Start: 2018-02-05 | End: 2018-02-05

## 2018-02-05 RX ORDER — OXYTOCIN 10 UNIT/ML
41.67 VIAL (ML) INJECTION
Qty: 20 | Refills: 0 | Status: DISCONTINUED | OUTPATIENT
Start: 2018-02-05 | End: 2018-02-07

## 2018-02-05 RX ORDER — HYDROCORTISONE 1 %
1 OINTMENT (GRAM) TOPICAL EVERY 4 HOURS
Qty: 0 | Refills: 0 | Status: DISCONTINUED | OUTPATIENT
Start: 2018-02-05 | End: 2018-02-07

## 2018-02-05 RX ORDER — LANOLIN
1 OINTMENT (GRAM) TOPICAL EVERY 6 HOURS
Qty: 0 | Refills: 0 | Status: DISCONTINUED | OUTPATIENT
Start: 2018-02-05 | End: 2018-02-07

## 2018-02-05 RX ORDER — DIBUCAINE 1 %
1 OINTMENT (GRAM) RECTAL EVERY 4 HOURS
Qty: 0 | Refills: 0 | Status: DISCONTINUED | OUTPATIENT
Start: 2018-02-05 | End: 2018-02-07

## 2018-02-05 RX ORDER — ACETAMINOPHEN 500 MG
975 TABLET ORAL EVERY 6 HOURS
Qty: 0 | Refills: 0 | Status: DISCONTINUED | OUTPATIENT
Start: 2018-02-05 | End: 2018-02-07

## 2018-02-05 RX ORDER — TETANUS TOXOID, REDUCED DIPHTHERIA TOXOID AND ACELLULAR PERTUSSIS VACCINE, ADSORBED 5; 2.5; 8; 8; 2.5 [IU]/.5ML; [IU]/.5ML; UG/.5ML; UG/.5ML; UG/.5ML
0.5 SUSPENSION INTRAMUSCULAR ONCE
Qty: 0 | Refills: 0 | Status: DISCONTINUED | OUTPATIENT
Start: 2018-02-05 | End: 2018-02-07

## 2018-02-05 RX ORDER — INFLUENZA VIRUS VACCINE 15; 15; 15; 15 UG/.5ML; UG/.5ML; UG/.5ML; UG/.5ML
0.5 SUSPENSION INTRAMUSCULAR ONCE
Qty: 0 | Refills: 0 | Status: DISCONTINUED | OUTPATIENT
Start: 2018-02-05 | End: 2018-02-07

## 2018-02-05 RX ADMIN — Medication 975 MILLIGRAM(S): at 17:46

## 2018-02-05 RX ADMIN — Medication 208 GRAM(S): at 07:37

## 2018-02-05 RX ADMIN — Medication 600 MILLIGRAM(S): at 19:09

## 2018-02-05 RX ADMIN — Medication 100 MILLIGRAM(S): at 19:08

## 2018-02-05 RX ADMIN — Medication 600 MILLIGRAM(S): at 20:00

## 2018-02-05 RX ADMIN — Medication 208 GRAM(S): at 03:13

## 2018-02-05 RX ADMIN — Medication 30 MILLIGRAM(S): at 10:05

## 2018-02-05 RX ADMIN — Medication 975 MILLIGRAM(S): at 19:10

## 2018-02-06 LAB
HCT VFR BLD CALC: 28.3 % — LOW (ref 34.5–45)
HGB BLD-MCNC: 9.7 G/DL — LOW (ref 11.5–15.5)

## 2018-02-06 RX ORDER — FERROUS SULFATE 325(65) MG
325 TABLET ORAL
Qty: 0 | Refills: 0 | Status: DISCONTINUED | OUTPATIENT
Start: 2018-02-06 | End: 2018-02-07

## 2018-02-06 RX ORDER — DOCUSATE SODIUM 100 MG
100 CAPSULE ORAL
Qty: 0 | Refills: 0 | Status: DISCONTINUED | OUTPATIENT
Start: 2018-02-06 | End: 2018-02-07

## 2018-02-06 RX ORDER — ASCORBIC ACID 60 MG
250 TABLET,CHEWABLE ORAL
Qty: 0 | Refills: 0 | Status: DISCONTINUED | OUTPATIENT
Start: 2018-02-06 | End: 2018-02-07

## 2018-02-06 RX ADMIN — Medication 600 MILLIGRAM(S): at 11:23

## 2018-02-06 RX ADMIN — Medication 600 MILLIGRAM(S): at 07:09

## 2018-02-06 RX ADMIN — Medication 100 MILLIGRAM(S): at 06:24

## 2018-02-06 RX ADMIN — Medication 975 MILLIGRAM(S): at 11:24

## 2018-02-06 RX ADMIN — Medication 600 MILLIGRAM(S): at 01:10

## 2018-02-06 RX ADMIN — Medication 600 MILLIGRAM(S): at 12:00

## 2018-02-06 RX ADMIN — Medication 975 MILLIGRAM(S): at 07:09

## 2018-02-06 RX ADMIN — Medication 975 MILLIGRAM(S): at 18:15

## 2018-02-06 RX ADMIN — Medication 975 MILLIGRAM(S): at 17:46

## 2018-02-06 RX ADMIN — Medication 600 MILLIGRAM(S): at 00:17

## 2018-02-06 RX ADMIN — Medication 600 MILLIGRAM(S): at 06:24

## 2018-02-06 RX ADMIN — Medication 975 MILLIGRAM(S): at 00:19

## 2018-02-06 RX ADMIN — Medication 1 TABLET(S): at 11:20

## 2018-02-06 RX ADMIN — Medication 975 MILLIGRAM(S): at 12:00

## 2018-02-06 RX ADMIN — Medication 975 MILLIGRAM(S): at 01:10

## 2018-02-06 RX ADMIN — Medication 975 MILLIGRAM(S): at 06:22

## 2018-02-06 RX ADMIN — Medication 325 MILLIGRAM(S): at 17:47

## 2018-02-06 RX ADMIN — Medication 600 MILLIGRAM(S): at 17:46

## 2018-02-06 RX ADMIN — Medication 100 MILLIGRAM(S): at 17:47

## 2018-02-06 RX ADMIN — Medication 600 MILLIGRAM(S): at 18:15

## 2018-02-06 RX ADMIN — Medication 250 MILLIGRAM(S): at 17:47

## 2018-02-06 NOTE — PROGRESS NOTE ADULT - ASSESSMENT
A/P:  21yo   PPD#1 s/p VAVD. intrapartum course c/b fever of 38. Afebrile. Patient is stable and doing well post-partum.

## 2018-02-06 NOTE — CHART NOTE - NSCHARTNOTEFT_GEN_A_CORE
PA NOTE     Day 1          Vital Signs Last 24 Hrs  T(C): 36.6 (2018 05:00), Max: 38 (2018 09:15)  T(F): 97.9 (2018 05:00), Max: 100.4 (2018 09:15)  HR: 85 (2018 05:00) (80 - 102)  BP: 135/78 (2018 05:00) (104/69 - 135/78)  BP(mean): 92 (2018 11:15) (77 - 98)  RR: 18 (2018 05:00) (16 - 18)  SpO2: 98% (2018 21:22) (97% - 99%)               9.7    x     )-----------( x        ( -06 @ 06:15 )             28.3                12.6   14.9  )-----------( 220      ( -04 @ 15:35 )             36.1         Plan:  - Ferrous Sulfate, Colace, Vitamin C supplementation.  - Monitor for signs/symptoms of anemia.

## 2018-02-06 NOTE — PROGRESS NOTE ADULT - SUBJECTIVE AND OBJECTIVE BOX
OB Progress Note: VAVD PPD#1    S: 23yo   PPD#1 s/p VAVD. intrapartum course c/b fever of 38. Patient feels well. Pain is well controlled. She is tolerating a regular diet and passing flatus. She is voiding spontaneously, and ambulating without difficulty. Denies CP/SOB. Denies lightheadedness/dizziness. Denies N/V.    O:  Vitals:  Vital Signs Last 24 Hrs  T(C): 36.5 (2018 21:22), Max: 38 (2018 09:15)  T(F): 97.7 (2018 21:22), Max: 100.4 (2018 09:15)  HR: 86 (2018 21:22) (80 - 102)  BP: 104/69 (2018 21:22) (104/69 - 128/78)  BP(mean): 92 (2018 11:15) (77 - 98)  RR: 16 (2018 21:22) (16 - 18)  SpO2: 98% (2018 21:22) (97% - 99%)    MEDICATIONS  (STANDING):  acetaminophen   Tablet. 975 milliGRAM(s) Oral every 6 hours  diphtheria/tetanus/pertussis (acellular) Vaccine (ADAcel) 0.5 milliLiter(s) IntraMuscular once  ibuprofen  Tablet 600 milliGRAM(s) Oral every 6 hours  influenza   Vaccine 0.5 milliLiter(s) IntraMuscular once  misoprostol Oral Solution 40 MICROGram(s) Oral every 2 hours  misoprostol Oral Solution 60 MICROGram(s) Oral every 2 hours  oxyCODONE    IR 5 milliGRAM(s) Oral every 3 hours  oxytocin Infusion 41.667 milliUNIT(s)/Min (125 mL/Hr) IV Continuous <Continuous>  prenatal multivitamin 1 Tablet(s) Oral daily  sodium chloride 0.9% lock flush 3 milliLiter(s) IV Push every 8 hours      Labs:  Blood type: O Positive  Rubella IgG: RPR: Negative                          12.6   14.9<H> >-----------< 220    (  @ 15:35 )             36.1                  Physical Exam:  General: NAD  Abdomen: soft, non-tender, non-distended, fundus firm  Vaginal: Lochia wnl  Extremities: No erythema/edema

## 2018-02-06 NOTE — PROGRESS NOTE ADULT - ATTENDING COMMENTS
Patient seen and examined  Patient without complaints  97.9    85     135/78    18  Soft nontender  Fundus firm  Lochia within normal limits  Extremities no pain    H&H 9.7/28.3      Plan  Postpartum day one without complaints  Prenatal Issues  Routine postpartum care

## 2018-02-06 NOTE — PROGRESS NOTE ADULT - PROBLEM SELECTOR PLAN 1
- Pain well controlled, continue current pain regimen  - Increase ambulation, SCDs when not ambulating  - Continue regular diet    Drew Bennett PGY-1

## 2018-02-07 ENCOUNTER — TRANSCRIPTION ENCOUNTER (OUTPATIENT)
Age: 23
End: 2018-02-07

## 2018-02-07 VITALS
TEMPERATURE: 99 F | SYSTOLIC BLOOD PRESSURE: 114 MMHG | HEART RATE: 78 BPM | RESPIRATION RATE: 18 BRPM | DIASTOLIC BLOOD PRESSURE: 78 MMHG

## 2018-02-07 PROCEDURE — 85027 COMPLETE CBC AUTOMATED: CPT

## 2018-02-07 PROCEDURE — G0463: CPT

## 2018-02-07 PROCEDURE — 86901 BLOOD TYPING SEROLOGIC RH(D): CPT

## 2018-02-07 PROCEDURE — 59025 FETAL NON-STRESS TEST: CPT

## 2018-02-07 PROCEDURE — 86850 RBC ANTIBODY SCREEN: CPT

## 2018-02-07 PROCEDURE — 86900 BLOOD TYPING SEROLOGIC ABO: CPT

## 2018-02-07 PROCEDURE — 85018 HEMOGLOBIN: CPT

## 2018-02-07 PROCEDURE — 59050 FETAL MONITOR W/REPORT: CPT

## 2018-02-07 PROCEDURE — 86780 TREPONEMA PALLIDUM: CPT

## 2018-02-07 RX ADMIN — Medication 600 MILLIGRAM(S): at 11:57

## 2018-02-07 RX ADMIN — Medication 325 MILLIGRAM(S): at 08:51

## 2018-02-07 RX ADMIN — Medication 600 MILLIGRAM(S): at 12:30

## 2018-02-07 RX ADMIN — Medication 600 MILLIGRAM(S): at 05:50

## 2018-02-07 RX ADMIN — Medication 975 MILLIGRAM(S): at 12:30

## 2018-02-07 RX ADMIN — Medication 975 MILLIGRAM(S): at 00:55

## 2018-02-07 RX ADMIN — Medication 975 MILLIGRAM(S): at 01:44

## 2018-02-07 RX ADMIN — Medication 100 MILLIGRAM(S): at 05:50

## 2018-02-07 RX ADMIN — Medication 600 MILLIGRAM(S): at 00:55

## 2018-02-07 RX ADMIN — Medication 975 MILLIGRAM(S): at 11:58

## 2018-02-07 RX ADMIN — Medication 250 MILLIGRAM(S): at 08:51

## 2018-02-07 RX ADMIN — Medication 1 TABLET(S): at 11:58

## 2018-02-07 RX ADMIN — Medication 600 MILLIGRAM(S): at 01:44

## 2018-02-07 RX ADMIN — Medication 975 MILLIGRAM(S): at 05:50

## 2018-02-07 NOTE — DISCHARGE NOTE OB - PATIENT PORTAL LINK FT
You can access the [email or web address] Patient Portal, offered by [facility], by registering with the following website: [email or web address]

## 2018-02-07 NOTE — DISCHARGE NOTE OB - MATERIALS PROVIDED
[facility] [place] Screening Program/[place]  Immunization Record/Shaken Baby Prevention Handout/Breastfeeding Guide and Packet/Breastfeeding Mother’s Support Group Information/Guide to Postpartum Care/Discharge Medication Information for Patients and Families Pocket Guide/Breastfeeding Log/Bottle Feeding Log/Back To Sleep Handout/Birth Certificate Instructions/[facility] Hearing Screen Program

## 2018-02-07 NOTE — PROGRESS NOTE ADULT - PROBLEM SELECTOR PLAN 1
- Pain well controlled, continue current pain regimen  - Increase ambulation, SCDs when not ambulating  - Continue regular diet  - Discharge planning     Drew Bennett PGY-1

## 2018-02-07 NOTE — PROGRESS NOTE ADULT - ATTENDING COMMENTS
Pt seen and evaluated.  She has been afebrile since delivery.  Stable PPD#1 s/p VAVD  Hct 28  Routine PP care  D/C planning for tomorrow

## 2018-02-07 NOTE — DISCHARGE NOTE OB - CARE PROVIDER_API CALL
Sonny Costello), Obstetrics and Gynecology  [address] [place] [address]  Phone: [phone]  Fax: [phone]

## 2018-02-07 NOTE — PROGRESS NOTE ADULT - SUBJECTIVE AND OBJECTIVE BOX
OB Progress Note: VAVD PPD#2    S: 23yo  PPD#2 s/p VAVD. Delivery c/b intrapartum fever. Currently afebrile. Patient feels well. Pain is well controlled. She is tolerating a regular diet and passing flatus. She is voiding spontaneously, and ambulating without difficulty. Denies CP/SOB. Denies lightheadedness/dizziness. Denies N/V.    O:  Vitals:   Vital Signs Last 24 Hrs  T(C): 36.7 (2018 06:03), Max: 36.7 (2018 17:49)  T(F): 98.1 (2018 06:03), Max: 98.1 (2018 17:49)  HR: 73 (2018 06:03) (73 - 101)  BP: 111/69 (2018 06:03) (111/69 - 137/85)  BP(mean): --  RR: 18 (2018 06:03) (18 - 18)  SpO2: 98% (2018 09:15) (98% - 98%)    MEDICATIONS  (STANDING):  acetaminophen   Tablet. 975 milliGRAM(s) Oral every 6 hours  ascorbic acid 250 milliGRAM(s) Oral two times a day  diphtheria/tetanus/pertussis (acellular) Vaccine (ADAcel) 0.5 milliLiter(s) IntraMuscular once  docusate sodium 100 milliGRAM(s) Oral two times a day  ferrous    sulfate 325 milliGRAM(s) Oral two times a day with meals  ibuprofen  Tablet 600 milliGRAM(s) Oral every 6 hours  influenza   Vaccine 0.5 milliLiter(s) IntraMuscular once  misoprostol Oral Solution 40 MICROGram(s) Oral every 2 hours  misoprostol Oral Solution 60 MICROGram(s) Oral every 2 hours  oxyCODONE    IR 5 milliGRAM(s) Oral every 3 hours  oxytocin Infusion 41.667 milliUNIT(s)/Min (125 mL/Hr) IV Continuous <Continuous>  prenatal multivitamin 1 Tablet(s) Oral daily  sodium chloride 0.9% lock flush 3 milliLiter(s) IV Push every 8 hours    MEDICATIONS  (PRN):  dibucaine 1% Ointment 1 Application(s) Topical every 4 hours PRN Perineal Discomfort  diphenhydrAMINE   Capsule 25 milliGRAM(s) Oral every 6 hours PRN Itching  glycerin Suppository - Adult 1 Suppository(s) Rectal at bedtime PRN Constipation  hydrocortisone 1% Cream 1 Application(s) Topical every 4 hours PRN Moderate to Severe Perineal Pain  lanolin Ointment 1 Application(s) Topical every 6 hours PRN Sore Nipples  magnesium hydroxide Suspension 30 milliLiter(s) Oral two times a day PRN Constipation  pramoxine 1%/zinc 5% Cream 1 Application(s) Topical every 4 hours PRN Moderate to Severe Perineal Pain  simethicone 80 milliGRAM(s) Chew every 6 hours PRN Gas  witch hazel Pads 1 Application(s) Topical every 4 hours PRN Perineal Discomfort      Labs:  Blood type: O Positive  Rubella IgG: RPR: Negative                          9.7<L>   -- >-----------< --    (  @ 06:15 )             28.3<L>                        12.6   14.9<H> >-----------< 220    (  @ 15:35 )             36.1                  Physical Exam:  General: NAD  Abdomen: soft, non-tender, non-distended, fundus firm  Vaginal: Lochia wnl  Extremities: No erythema/edema

## 2018-02-07 NOTE — DISCHARGE NOTE OB - CARE PLAN
Principal Discharge DX:	Vacuum extractor delivery, delivered  Goal:	recovery  Assessment and plan of treatment:	normal diet; limited activity until cleared; follow-up in 6 weeks,

## 2018-02-07 NOTE — DISCHARGE NOTE OB - HOSPITAL COURSE
21yo  PPD#2 s/p VAVD. Delivery c/b intrapartum fever. Currently afebrile. Patient is stable and doing well post-partum.  Her h/h is stable and she is meeting all milestones.

## 2018-02-07 NOTE — PROGRESS NOTE ADULT - ASSESSMENT
A/P:  23yo  PPD#2 s/p VAVD. Delivery c/b intrapartum fever. Currently afebrile. Patient is stable and doing well post-partum.

## 2018-02-09 ENCOUNTER — APPOINTMENT (OUTPATIENT)
Dept: OBGYN | Facility: CLINIC | Age: 23
End: 2018-02-09

## 2018-03-22 PROBLEM — O99.820 GROUP B STREPTOCOCCUS CARRIER, ANTEPARTUM: Status: RESOLVED | Noted: 2018-01-24 | Resolved: 2018-03-22

## 2018-03-22 PROBLEM — Z34.03 ENCOUNTER FOR PRENATAL CARE IN THIRD TRIMESTER OF FIRST PREGNANCY: Status: RESOLVED | Noted: 2017-07-20 | Resolved: 2018-03-22

## 2018-03-22 PROBLEM — O09.613 YOUNG PRIMIGRAVIDA IN THIRD TRIMESTER: Status: RESOLVED | Noted: 2017-11-17 | Resolved: 2018-03-22

## 2018-03-22 PROBLEM — O28.9 ABNORMAL ANTENATAL TEST: Status: RESOLVED | Noted: 2017-09-24 | Resolved: 2018-03-22

## 2018-03-23 ENCOUNTER — APPOINTMENT (OUTPATIENT)
Dept: OBGYN | Facility: CLINIC | Age: 23
End: 2018-03-23
Payer: COMMERCIAL

## 2018-03-23 DIAGNOSIS — Z34.03 ENCOUNTER FOR SUPERVISION OF NORMAL FIRST PREGNANCY, THIRD TRIMESTER: ICD-10-CM

## 2018-03-23 DIAGNOSIS — O99.820 STREPTOCOCCUS B CARRIER STATE COMPLICATING PREGNANCY: ICD-10-CM

## 2018-03-23 DIAGNOSIS — O28.9 UNSPECIFIED ABNORMAL FINDINGS ON ANTENATAL SCREENING OF MOTHER: ICD-10-CM

## 2018-03-23 DIAGNOSIS — O09.613 SUPERVISION OF YOUNG PRIMIGRAVIDA, THIRD TRIMESTER: ICD-10-CM

## 2018-03-30 ENCOUNTER — APPOINTMENT (OUTPATIENT)
Dept: OBGYN | Facility: CLINIC | Age: 23
End: 2018-03-30
Payer: COMMERCIAL

## 2018-03-30 VITALS — SYSTOLIC BLOOD PRESSURE: 118 MMHG | WEIGHT: 216 LBS | DIASTOLIC BLOOD PRESSURE: 80 MMHG

## 2018-03-30 DIAGNOSIS — Z30.09 ENCOUNTER FOR OTHER GENERAL COUNSELING AND ADVICE ON CONTRACEPTION: ICD-10-CM

## 2018-03-30 PROCEDURE — 0503F POSTPARTUM CARE VISIT: CPT

## 2018-03-30 RX ORDER — NORETHINDRONE ACETATE AND ETHINYL ESTRADIOL AND FERROUS FUMARATE 1MG-20(21)
1-20 KIT ORAL DAILY
Qty: 1 | Refills: 1 | Status: ACTIVE | COMMUNITY
Start: 2018-03-30 | End: 1900-01-01

## 2019-05-01 ENCOUNTER — RX RENEWAL (OUTPATIENT)
Age: 24
End: 2019-05-01

## 2019-05-02 RX ORDER — IBUPROFEN 600 MG/1
600 TABLET, FILM COATED ORAL 3 TIMES DAILY
Qty: 60 | Refills: 0 | Status: ACTIVE | COMMUNITY
Start: 2018-03-30 | End: 1900-01-01

## 2020-04-28 ENCOUNTER — TRANSCRIPTION ENCOUNTER (OUTPATIENT)
Age: 25
End: 2020-04-28

## 2020-12-15 PROBLEM — Z01.419 ENCOUNTER FOR CERVICAL PAP SMEAR WITH PELVIC EXAM: Status: RESOLVED | Noted: 2017-06-23 | Resolved: 2020-12-15

## 2020-12-15 PROBLEM — Z01.419 ENCOUNTER FOR ROUTINE PELVIC EXAMINATION: Status: RESOLVED | Noted: 2017-06-23 | Resolved: 2020-12-15

## 2022-09-06 NOTE — PATIENT PROFILE OB - TDAP IMMUNIZATION DATE
Admission and assessment complete. Patient is alert and oriented X4. Vital signs are stable except for a low grade temperature of 99. Denies any SOB, N/V, dizziness or pain at this time. Hospitalist put orders in for the patient to start Golytely; and GI was added on for consult. Patient had a colonoscopy on the 19th of August and is still complaining of bloody stools. Skin is intact and oriented to room and call light. Denies any needs at this time and call light is within reach.   Electronically signed by Frieda Mccarthy RN on 8/20/2021 at 6:01 AM
Name band;
10/2017

## 2024-07-02 ENCOUNTER — NON-APPOINTMENT (OUTPATIENT)
Age: 29
End: 2024-07-02